# Patient Record
Sex: FEMALE | Race: WHITE | NOT HISPANIC OR LATINO | Employment: FULL TIME | URBAN - METROPOLITAN AREA
[De-identification: names, ages, dates, MRNs, and addresses within clinical notes are randomized per-mention and may not be internally consistent; named-entity substitution may affect disease eponyms.]

---

## 2018-11-19 ENCOUNTER — CONVERSION ENCOUNTER (OUTPATIENT)
Dept: MAMMOGRAPHY | Facility: HOSPITAL | Age: 53
End: 2018-11-19

## 2019-12-03 ENCOUNTER — HOSPITAL ENCOUNTER (OUTPATIENT)
Dept: ULTRASOUND IMAGING | Facility: HOSPITAL | Age: 54
Discharge: HOME | End: 2019-12-03
Attending: NURSE PRACTITIONER

## 2019-12-03 LAB
T4 FREE SERPL-MCNC: 1.2 NG/DL (ref 0.9–1.8)
TSH SERPL-ACNC: 0.32 M[IU]/L (ref 0.27–4.2)

## 2019-12-04 LAB
CONV ANTI MICROSOMAL AB: 36 IU/ML (ref 0–34)
T3FREE SERPL-MCNC: 3.2 PG/ML (ref 2–4.4)

## 2019-12-09 ENCOUNTER — HOSPITAL ENCOUNTER (OUTPATIENT)
Dept: GENERAL RADIOLOGY | Facility: HOSPITAL | Age: 54
Discharge: HOME | End: 2019-12-09
Attending: NURSE PRACTITIONER

## 2019-12-12 ENCOUNTER — HOSPITAL ENCOUNTER (OUTPATIENT)
Dept: ULTRASOUND IMAGING | Facility: HOSPITAL | Age: 54
Discharge: HOME | End: 2019-12-12
Attending: NURSE PRACTITIONER

## 2020-08-26 ENCOUNTER — HOSPITAL ENCOUNTER (OUTPATIENT)
Dept: ORTHOPEDIC SURGERY | Facility: CLINIC | Age: 55
Setting detail: RECURRING SERIES
Discharge: HOME | End: 2020-11-24
Attending: ORTHOPAEDIC SURGERY

## 2020-10-16 ENCOUNTER — HOSPITAL ENCOUNTER (OUTPATIENT)
Dept: LAB | Facility: HOSPITAL | Age: 55
Discharge: HOME | End: 2020-10-16

## 2020-10-16 LAB
ALBUMIN SERPL-MCNC: 4.6 G/DL (ref 3.5–5)
ALBUMIN/GLOB SERPL: 1.6 {RATIO} (ref 1.4–2.6)
ALP SERPL-CCNC: 84 U/L (ref 53–141)
ALT SERPL-CCNC: 19 U/L (ref 10–40)
ANION GAP SERPL CALC-SCNC: 14 MMOL/L (ref 8–19)
AST SERPL-CCNC: 20 U/L (ref 15–50)
BASOPHILS # BLD AUTO: 0.07 10*3/UL (ref 0–0.2)
BASOPHILS NFR BLD AUTO: 1.2 % (ref 0–3)
BILIRUB SERPL-MCNC: 0.31 MG/DL (ref 0.2–1.3)
BUN SERPL-MCNC: 18 MG/DL (ref 5–25)
BUN/CREAT SERPL: 23 {RATIO} (ref 6–20)
CALCIUM SERPL-MCNC: 9.9 MG/DL (ref 8.7–10.4)
CHLORIDE SERPL-SCNC: 104 MMOL/L (ref 99–111)
CHOLEST SERPL-MCNC: 175 MG/DL (ref 107–200)
CHOLEST/HDLC SERPL: 3.1 {RATIO} (ref 3–6)
CONV ABS IMM GRAN: 0.01 10*3/UL (ref 0–0.2)
CONV CO2: 26 MMOL/L (ref 22–32)
CONV IMMATURE GRAN: 0.2 % (ref 0–1.8)
CONV TOTAL PROTEIN: 7.4 G/DL (ref 6.3–8.2)
CREAT UR-MCNC: 0.79 MG/DL (ref 0.5–0.9)
DEPRECATED RDW RBC AUTO: 44.9 FL (ref 36.4–46.3)
EOSINOPHIL # BLD AUTO: 0.17 10*3/UL (ref 0–0.7)
EOSINOPHIL # BLD AUTO: 3 % (ref 0–7)
ERYTHROCYTE [DISTWIDTH] IN BLOOD BY AUTOMATED COUNT: 13.2 % (ref 11.7–14.4)
FSH SERPL-ACNC: 60.2 M[IU]/ML
GFR SERPLBLD BASED ON 1.73 SQ M-ARVRAT: >60 ML/MIN/{1.73_M2}
GLOBULIN UR ELPH-MCNC: 2.8 G/DL (ref 2–3.5)
GLUCOSE SERPL-MCNC: 104 MG/DL (ref 65–99)
HCT VFR BLD AUTO: 45.4 % (ref 37–47)
HDLC SERPL-MCNC: 56 MG/DL (ref 40–60)
HGB BLD-MCNC: 14.7 G/DL (ref 12–16)
LDLC SERPL CALC-MCNC: 103 MG/DL (ref 70–100)
LYMPHOCYTES # BLD AUTO: 1.87 10*3/UL (ref 1–5)
LYMPHOCYTES NFR BLD AUTO: 33.3 % (ref 20–45)
MCH RBC QN AUTO: 29.8 PG (ref 27–31)
MCHC RBC AUTO-ENTMCNC: 32.4 G/DL (ref 33–37)
MCV RBC AUTO: 92.1 FL (ref 81–99)
MONOCYTES # BLD AUTO: 0.47 10*3/UL (ref 0.2–1.2)
MONOCYTES NFR BLD AUTO: 8.4 % (ref 3–10)
NEUTROPHILS # BLD AUTO: 3.02 10*3/UL (ref 2–8)
NEUTROPHILS NFR BLD AUTO: 53.9 % (ref 30–85)
NRBC CBCN: 0 % (ref 0–0.7)
OSMOLALITY SERPL CALC.SUM OF ELEC: 292 MOSM/KG (ref 273–304)
PLATELET # BLD AUTO: 241 10*3/UL (ref 130–400)
PMV BLD AUTO: 10.9 FL (ref 9.4–12.3)
POTASSIUM SERPL-SCNC: 3.9 MMOL/L (ref 3.5–5.3)
RBC # BLD AUTO: 4.93 10*6/UL (ref 4.2–5.4)
SODIUM SERPL-SCNC: 140 MMOL/L (ref 135–147)
TESTOST SERPL-MCNC: 13 NG/DL (ref 3–41)
TRIGL SERPL-MCNC: 79 MG/DL (ref 40–150)
TSH SERPL-ACNC: 1.24 M[IU]/L (ref 0.27–4.2)
VLDLC SERPL-MCNC: 16 MG/DL (ref 5–37)
WBC # BLD AUTO: 5.61 10*3/UL (ref 4.8–10.8)

## 2020-10-19 LAB
ESTRADIOL SERPL HS-MCNC: <5 PG/ML
PROGEST SERPL-MCNC: 0.1 NG/ML

## 2020-12-16 ENCOUNTER — HOSPITAL ENCOUNTER (OUTPATIENT)
Dept: LAB | Facility: HOSPITAL | Age: 55
Discharge: HOME | End: 2020-12-16

## 2020-12-16 LAB — FSH SERPL-ACNC: 25.5 M[IU]/ML

## 2020-12-17 LAB — ESTRADIOL SERPL HS-MCNC: 68.7 PG/ML

## 2020-12-25 LAB
CONV TESTOSTERONE, FREE: 15 PG/ML
TESTOST FREE MFR SERPL: 0.6 %
TESTOST SERPL-MCNC: 246 NG/DL

## 2021-03-12 ENCOUNTER — HOSPITAL ENCOUNTER (OUTPATIENT)
Dept: LAB | Facility: HOSPITAL | Age: 56
Discharge: HOME | End: 2021-03-12

## 2021-03-12 LAB
FSH SERPL-ACNC: 12.9 M[IU]/ML
TESTOST SERPL-MCNC: 296 NG/DL (ref 3–41)

## 2021-03-13 LAB — ESTRADIOL SERPL HS-MCNC: 87.9 PG/ML

## 2021-03-31 ENCOUNTER — HOSPITAL ENCOUNTER (OUTPATIENT)
Dept: MAMMOGRAPHY | Facility: HOSPITAL | Age: 56
Discharge: HOME | End: 2021-03-31
Attending: NURSE PRACTITIONER

## 2021-06-16 ENCOUNTER — TRANSCRIBE ORDERS (OUTPATIENT)
Dept: ADMINISTRATIVE | Facility: HOSPITAL | Age: 56
End: 2021-06-16

## 2021-06-16 ENCOUNTER — LAB (OUTPATIENT)
Dept: LAB | Facility: HOSPITAL | Age: 56
End: 2021-06-16

## 2021-06-16 DIAGNOSIS — E89.40 POSTABLATIVE OVARIAN FAILURE: ICD-10-CM

## 2021-06-16 LAB
ESTRADIOL SERPL HS-MCNC: 181 PG/ML
FSH SERPL-ACNC: 9.37 MIU/ML

## 2021-06-16 PROCEDURE — 84403 ASSAY OF TOTAL TESTOSTERONE: CPT

## 2021-06-16 PROCEDURE — 83001 ASSAY OF GONADOTROPIN (FSH): CPT

## 2021-06-16 PROCEDURE — 82670 ASSAY OF TOTAL ESTRADIOL: CPT

## 2021-06-16 PROCEDURE — 36415 COLL VENOUS BLD VENIPUNCTURE: CPT

## 2021-06-19 LAB — TESTOST SERPL-MCNC: 216.3 NG/DL

## 2021-10-14 ENCOUNTER — HOSPITAL ENCOUNTER (OUTPATIENT)
Dept: GENERAL RADIOLOGY | Facility: HOSPITAL | Age: 56
Discharge: HOME | End: 2021-10-14
Admitting: NURSE PRACTITIONER

## 2021-10-14 ENCOUNTER — TRANSCRIBE ORDERS (OUTPATIENT)
Dept: GENERAL RADIOLOGY | Facility: HOSPITAL | Age: 56
End: 2021-10-14

## 2021-10-14 DIAGNOSIS — M25.559 HIP PAIN: Primary | ICD-10-CM

## 2021-10-14 DIAGNOSIS — M25.559 HIP PAIN: ICD-10-CM

## 2021-10-14 PROCEDURE — 73502 X-RAY EXAM HIP UNI 2-3 VIEWS: CPT

## 2022-02-11 ENCOUNTER — TREATMENT (OUTPATIENT)
Dept: PHYSICAL THERAPY | Facility: CLINIC | Age: 57
End: 2022-02-11

## 2022-02-11 DIAGNOSIS — R29.898 WEAKNESS OF RIGHT UPPER EXTREMITY: ICD-10-CM

## 2022-02-11 DIAGNOSIS — M25.611 SHOULDER STIFFNESS, RIGHT: ICD-10-CM

## 2022-02-11 DIAGNOSIS — Z98.890 S/P RIGHT ROTATOR CUFF REPAIR: Primary | ICD-10-CM

## 2022-02-11 PROCEDURE — 97110 THERAPEUTIC EXERCISES: CPT | Performed by: PHYSICAL THERAPIST

## 2022-02-11 PROCEDURE — 97140 MANUAL THERAPY 1/> REGIONS: CPT | Performed by: PHYSICAL THERAPIST

## 2022-02-11 PROCEDURE — 97161 PT EVAL LOW COMPLEX 20 MIN: CPT | Performed by: PHYSICAL THERAPIST

## 2022-02-11 NOTE — PROGRESS NOTES
Physical Therapy Initial Evaluation and Plan of Care    Patient: Shobha Polo   : 1965  Diagnosis/ICD-10 Code:  S/P right rotator cuff repair [Z98.890]  Referring practitioner: Hernan Jensen MD  Date of Initial Visit: 2022  Today's Date: 2022  Patient seen for 1 sessions           Subjective Questionnaire: QuickDASH: 34 = 40-59%      Subjective Evaluation    History of Present Illness  Mechanism of injury: Pt is s/p Right RTC repair, biceps tenodesis 22, presents without her sling today. She reports having the same surgery on the left previously. She has been doing pendulums and lower level stretching at home.     Pain  Current pain ratin  At best pain ratin  At worst pain ratin  Quality: tight and discomfort  Relieving factors: rest  Aggravating factors: sleeping    Hand dominance: right    Patient Goals  Patient goals for therapy: decreased pain, increased motion, increased strength and independence with ADLs/IADLs             Objective          Observations     Additional Shoulder Observation Details  Pt presents to clinic without a sling, incisions are well healed. Mild scapular winging    Passive Range of Motion     Right Shoulder   Flexion: 110 degrees with pain  Abduction: 105 degrees   External rotation 45°: 40 degrees   Internal rotation 45°: 45 degrees     Strength/Myotome Testing     Additional Strength Details  NT due to surgical precautions      See Exercise, Manual, and Modality Logs for complete treatment.     Assessment & Plan     Assessment  Impairments: abnormal muscle firing, abnormal or restricted ROM, activity intolerance, impaired physical strength and pain with function  Functional Limitations: carrying objects, lifting, sleeping, reaching behind back and reaching overhead  Assessment details: The patient presents to physical therapy with complaints of right shoulder pain s/p Right RTC repair, biceps tenodesis 22. The patient presents  with associated shoulder weakness, stiffness, and functional deficits (QuickDASH). The patient would benefit from skilled PT intervention to address the above mentioned functional limitations.     Prognosis: good    Goals  Plan Goals: SHOULDER  PROBLEMS:     1. The patient has limited ROM of the right shoulder.    LTG 1: 12 weeks:  The patient will demonstrate AROM 160 degrees of shoulder flexion, 150 degrees of shoulder abduction, 80 degrees of shoulder external rotation, and 80 degrees of shoulder internal rotation to allow the patient to reach into upper kitchen cabinets and manipulate clothing behind the back with greater ease.    STATUS:  New   STG 1a: 6 weeks:  The patient will demonstrate PROM 160 degrees of  shoulder flexion, 150 degrees of shoulder abduction, 80 degrees of shoulder external rotation, and 80 degrees of shoulder internal rotation.    STATUS:  New   TREATMENT: Manual therapy, therapeutic exercise, home exercise instruction, and modalities as needed to include: electrical stimulation, ultrasound, moist heat, and ice.    2. The patient has limited strength of the right shoulder.   LTG 2: 12 weeks:  The patient will demonstrate 4+ /5 strength for shoulder flexion, abduction, external rotation, and internal rotation in order to demonstrate improved shoulder stability.    STATUS:  New   STG 2a: 6 weeks:  The patient will demonstrate 4- /5 strength for shoulder flexion, abduction, external rotation, and internal rotation.    STATUS:  New   STG2b:  6 weeks:  The patient will be independent with home exercises.     STATUS:  New   TREATMENT: Manual therapy, therapeutic exercise, home exercise instruction, and modalities as needed to include: electrical stimulation, ultrasound, moist heat, and ice.     3. The patient complains of pain to the right shoulder.   LTG 3: 12 weeks:  The patient will report a pain rating of 3 /10 or better in order to improve sleep quality and tolerance to performance of  activities of daily living.    STATUS:  New   STG 3a: 6 weeks:  The patient will report a pain rating of 5 /10 or better.     STATUS:  New   TREATMENT: Manual therapy, therapeutic exercise, home exercise instruction, and modalities as needed to include: electrical stimulation, ultrasound, moist heat, and ice.    4. Carrying, Moving, and Handling Objects Functional Limitation     LTG 4: 12 weeks:  The patient will demonstrate 1-19% limitation by achieving a score of 12-19 on the QuickDASH.    STATUS:  New   STG 4a: 6 weeks:  The patient will demonstrate 20-39 % limitation by achieving a score of 20-27 on the QuickDASH.      STATUS:  New   TREATMENT:  Manual therapy, therapeutic exercise, home exercise instruction, and modalities as needed to include: moist heat, electrical stimulation, and ultrasound.       Plan  Therapy options: will be seen for skilled therapy services  Planned modality interventions: TENS, cryotherapy, thermotherapy (hydrocollator packs) and dry needling  Planned therapy interventions: functional ROM exercises, home exercise program, joint mobilization, manual therapy, soft tissue mobilization, stretching and strengthening  Frequency: 3x week  Duration in weeks: 12  Treatment plan discussed with: patient        Visit Diagnoses:    ICD-10-CM ICD-9-CM   1. S/P right rotator cuff repair  Z98.890 V45.89   2. Shoulder stiffness, right  M25.611 719.51   3. Weakness of right upper extremity  R29.898 729.89       History # of Personal Factors and/or Comorbidities: LOW (0)  Examination of Body System(s): # of elements: LOW (1-2)  Clinical Presentation: STABLE   Clinical Decision Making: LOW       Timed:         Manual Therapy:    10     mins  04925;     Therapeutic Exercise:    15     mins  49601;     Neuromuscular Michelle:    0    mins  78862;    Therapeutic Activity:     0     mins  96799;     Gait Trainin     mins  83302;     Ultrasound:     0     mins  36877;    Ionto                                0    mins   67832  Self Care                       0     mins   28015  Canalith Repos    0     mins 15481      Un-Timed:  Electrical Stimulation:    0     mins  56895 (MC );  Dry Needling     0     mins self-pay  Traction     0     mins 65404  Low Eval     15     Mins  31471  Mod Eval     0     Mins  97177  High Eval                       0     Mins  68188  Re-Eval                           0    mins  15210    Timed Treatment:   25   mins   Total Treatment:     40   mins    PT SIGNATURE: Saqib Muhammad PT     Electronically signed 2/14/2022    [place] License: PT - [id number]     Initial Certification  Certification Period: 2/14/2022 thru 5/14/2022  I certify that the therapy services are furnished while this patient is under my care.  The services outlined above are required by this patient, and will be reviewed every 90 days.     PHYSICIAN: Hernan Jensen MD   NPI: [id number]                                        DATE:     Please sign and return via fax to [phone]. Thank you, [facility].

## 2022-02-15 ENCOUNTER — TREATMENT (OUTPATIENT)
Dept: PHYSICAL THERAPY | Facility: CLINIC | Age: 57
End: 2022-02-15

## 2022-02-15 DIAGNOSIS — R29.898 WEAKNESS OF RIGHT UPPER EXTREMITY: ICD-10-CM

## 2022-02-15 DIAGNOSIS — M25.611 SHOULDER STIFFNESS, RIGHT: ICD-10-CM

## 2022-02-15 DIAGNOSIS — Z98.890 S/P RIGHT ROTATOR CUFF REPAIR: Primary | ICD-10-CM

## 2022-02-15 PROCEDURE — 97140 MANUAL THERAPY 1/> REGIONS: CPT | Performed by: PHYSICAL THERAPIST

## 2022-02-15 PROCEDURE — 97110 THERAPEUTIC EXERCISES: CPT | Performed by: PHYSICAL THERAPIST

## 2022-02-15 NOTE — PROGRESS NOTES
Physical Therapy Daily Progress Note    Patient: Shobha Polo   : 1965  Diagnosis/ICD-10 Code:  S/P right rotator cuff repair [Z98.890]  Referring practitioner: Hernan Jensen MD  Date of Initial Visit: Type: THERAPY  Noted: 2022  Today's Date: 2/15/2022  Patient seen for 2 sessions           Subjective Evaluation    History of Present Illness    Subjective comment: Pt reporting she is having more soreness and stiffness this morning, she did lift some bags yesterday. Pain  Current pain ratin           Objective   See Exercise, Manual, and Modality Logs for complete treatment.       Assessment & Plan     Assessment    Assessment details: Pt tolerated today's session fair, does present with increased stiffness and pain with flexion AAROM and PROM today, with symptoms of impingement. Progressed AAROM today, discussed pt using ice at home, and discussed reducing lifting activities at this time for healing. Pt would benefit from continued skilled therapy to address deficits. Progress per plan of care.                 Manual Therapy:    15     mins  21257;  Therapeutic Exercise:    12     mins  46075;     Neuromuscular Michelle:    0    mins  98278;    Therapeutic Activity:     0     mins  30453;     Gait Trainin     mins  33194;     Ultrasound:     0     mins  38611;    Electrical Stimulation:    0     mins  15439 ( );  Dry Needling     0     mins self-pay;  Aquatic Therapy    0     mins  25797;  Mechanical Traction    0     mins  46876  Moist Heat     0     mins  No charge    Timed Treatment:   27   mins   Total Treatment:     27   mins    Saqib Muhammad PT  Physical Therapist    Electronically signed 2/15/2022    [place] License: PT - [id number]

## 2022-02-16 ENCOUNTER — TREATMENT (OUTPATIENT)
Dept: PHYSICAL THERAPY | Facility: CLINIC | Age: 57
End: 2022-02-16

## 2022-02-16 DIAGNOSIS — Z98.890 S/P RIGHT ROTATOR CUFF REPAIR: Primary | ICD-10-CM

## 2022-02-16 DIAGNOSIS — R29.898 WEAKNESS OF RIGHT UPPER EXTREMITY: ICD-10-CM

## 2022-02-16 DIAGNOSIS — M25.611 SHOULDER STIFFNESS, RIGHT: ICD-10-CM

## 2022-02-16 PROCEDURE — 97140 MANUAL THERAPY 1/> REGIONS: CPT | Performed by: PHYSICAL THERAPIST

## 2022-02-16 PROCEDURE — 97530 THERAPEUTIC ACTIVITIES: CPT | Performed by: PHYSICAL THERAPIST

## 2022-02-16 PROCEDURE — 97110 THERAPEUTIC EXERCISES: CPT | Performed by: PHYSICAL THERAPIST

## 2022-02-16 NOTE — PROGRESS NOTES
"Physical Therapy Daily Progress Note    Patient: Shobha Polo   : 1965  Diagnosis/ICD-10 Code:  S/P right rotator cuff repair [Z98.890]  Referring practitioner: No ref. provider found  Date of Initial Visit: Type: THERAPY  Noted: 2022  Today's Date: 2022  Patient seen for 3 sessions           Subjective Evaluation    History of Present Illness    Subjective comment: Pt reporting she is feeling less stiff today, but still notices \"catches\" in her shoulder with the AAROM flexion.Pain  Current pain ratin           Objective   See Exercise, Manual, and Modality Logs for complete treatment.       Assessment & Plan     Assessment    Assessment details: ROM much improved today, less pain at end ranges. Likely having impingement at this time with OH activities. Pt tolerated today's session well. Pt would benefit from continued skilled therapy to address deficits. Progress per plan of care.                 Manual Therapy:    10     mins  33351;  Therapeutic Exercise:    10     mins  65857;     Neuromuscular Michelle:    0    mins  46541;    Therapeutic Activity:     8     mins  59464;     Gait Trainin     mins  89093;     Ultrasound:     0     mins  13867;    Electrical Stimulation:    0     mins  81129 ( );  Dry Needling     0     mins self-pay;  Aquatic Therapy    0     mins  60825;  Mechanical Traction    0     mins  63307  Moist Heat     0     mins  No charge    Timed Treatment:   28   mins   Total Treatment:     28   mins    Saqib Muhammad PT  Physical Therapist    Electronically signed 2022    [place] License: PT - [id number]   "

## 2022-02-22 ENCOUNTER — TREATMENT (OUTPATIENT)
Dept: PHYSICAL THERAPY | Facility: CLINIC | Age: 57
End: 2022-02-22

## 2022-02-22 DIAGNOSIS — R29.898 WEAKNESS OF RIGHT UPPER EXTREMITY: ICD-10-CM

## 2022-02-22 DIAGNOSIS — M25.611 SHOULDER STIFFNESS, RIGHT: ICD-10-CM

## 2022-02-22 DIAGNOSIS — Z98.890 S/P RIGHT ROTATOR CUFF REPAIR: Primary | ICD-10-CM

## 2022-02-22 PROCEDURE — 97530 THERAPEUTIC ACTIVITIES: CPT | Performed by: PHYSICAL THERAPIST

## 2022-02-22 PROCEDURE — 97110 THERAPEUTIC EXERCISES: CPT | Performed by: PHYSICAL THERAPIST

## 2022-02-22 PROCEDURE — 97112 NEUROMUSCULAR REEDUCATION: CPT | Performed by: PHYSICAL THERAPIST

## 2022-02-22 NOTE — PROGRESS NOTES
Physical Therapy Daily Progress Note    Patient: Shobha Polo   : 1965  Diagnosis/ICD-10 Code:  S/P right rotator cuff repair [Z98.890]  Referring practitioner: Hernan Jensen MD  Date of Initial Visit: Type: THERAPY  Noted: 2022  Today's Date: 2022  Patient seen for 4 sessions           Subjective Evaluation    History of Present Illness    Subjective comment: Pt reporting she had a good report from the physician, he wrote her another script and wants her to continue with PT.Pain  Current pain rating: 3           Objective   See Exercise, Manual, and Modality Logs for complete treatment.       Assessment & Plan     Assessment    Assessment details: Pt tolerated today's session well, progressed isometrics today with pt having mild increased pain with IR, but this reduced with repetitions. Pt responded well to pulleys as well for improving ROM. Pt would benefit from continued skilled therapy to address deficits. Progress per plan of care.                 Manual Therapy:    0     mins  45601;  Therapeutic Exercise:    10     mins  64173;     Neuromuscular Michelle:    12    mins  26722;    Therapeutic Activity:     8     mins  86047;     Gait Trainin     mins  32795;     Ultrasound:     0     mins  98113;    Electrical Stimulation:    0     mins  58012 ( );  Dry Needling     0     mins self-pay;  Aquatic Therapy    0     mins  90623;  Mechanical Traction    0     mins  19852  Moist Heat     0     mins  No charge    Timed Treatment:   30   mins   Total Treatment:     30   mins    Saqib Muhammad PT  Physical Therapist    Electronically signed 2022    [place] License: PT - [id number]

## 2022-02-24 ENCOUNTER — TREATMENT (OUTPATIENT)
Dept: PHYSICAL THERAPY | Facility: CLINIC | Age: 57
End: 2022-02-24

## 2022-02-24 DIAGNOSIS — Z98.890 S/P RIGHT ROTATOR CUFF REPAIR: Primary | ICD-10-CM

## 2022-02-24 DIAGNOSIS — M25.611 SHOULDER STIFFNESS, RIGHT: ICD-10-CM

## 2022-02-24 DIAGNOSIS — R29.898 WEAKNESS OF RIGHT UPPER EXTREMITY: ICD-10-CM

## 2022-02-24 PROCEDURE — 97140 MANUAL THERAPY 1/> REGIONS: CPT | Performed by: PHYSICAL THERAPIST

## 2022-02-24 PROCEDURE — 97110 THERAPEUTIC EXERCISES: CPT | Performed by: PHYSICAL THERAPIST

## 2022-02-24 PROCEDURE — 97530 THERAPEUTIC ACTIVITIES: CPT | Performed by: PHYSICAL THERAPIST

## 2022-02-24 NOTE — PROGRESS NOTES
Physical Therapy Daily Progress Note    Patient: Shobha Polo   : 1965  Diagnosis/ICD-10 Code:  S/P right rotator cuff repair [Z98.890]  Referring practitioner: Hernan Jensen MD  Date of Initial Visit: Type: THERAPY  Noted: 2022  Today's Date: 2022  Patient seen for 5 sessions           Subjective Evaluation    History of Present Illness    Subjective comment: Pt reporting she was not too sore from the other day, still occasionally has catches in her shoulder. Felt a little sharp pain when reaching for her cell phone in the passenger seat, but feeling better now.Pain  Current pain ratin           Objective   See Exercise, Manual, and Modality Logs for complete treatment.       Assessment & Plan     Assessment    Assessment details: Pt tolerated today's session well with progression of resistance and ROM, minimal pain during session except at end range with PROM. Pt would benefit from continued skilled therapy to address deficits. Progress per plan of care.                 Manual Therapy:    10     mins  25466;  Therapeutic Exercise:    12     mins  83837;     Neuromuscular Michelle:    0    mins  25530;    Therapeutic Activity:     8     mins  59514;     Gait Trainin     mins  05657;     Ultrasound:     0     mins  50952;    Electrical Stimulation:    0     mins  71522 ( );  Dry Needling     0     mins self-pay;  Aquatic Therapy    0     mins  89014;  Mechanical Traction    0     mins  17231  Moist Heat     0     mins  No charge    Timed Treatment:   30   mins   Total Treatment:     30   mins    Saqib Muhammad PT  Physical Therapist    Electronically signed 2022    [place] License: PT - [id number]

## 2022-03-02 ENCOUNTER — TREATMENT (OUTPATIENT)
Dept: PHYSICAL THERAPY | Facility: CLINIC | Age: 57
End: 2022-03-02

## 2022-03-02 DIAGNOSIS — M25.611 SHOULDER STIFFNESS, RIGHT: ICD-10-CM

## 2022-03-02 DIAGNOSIS — R29.898 WEAKNESS OF RIGHT UPPER EXTREMITY: ICD-10-CM

## 2022-03-02 DIAGNOSIS — Z98.890 S/P RIGHT ROTATOR CUFF REPAIR: Primary | ICD-10-CM

## 2022-03-02 PROCEDURE — 97110 THERAPEUTIC EXERCISES: CPT | Performed by: PHYSICAL THERAPIST

## 2022-03-02 PROCEDURE — 97530 THERAPEUTIC ACTIVITIES: CPT | Performed by: PHYSICAL THERAPIST

## 2022-03-02 PROCEDURE — 97140 MANUAL THERAPY 1/> REGIONS: CPT | Performed by: PHYSICAL THERAPIST

## 2022-03-02 NOTE — PROGRESS NOTES
"Physical Therapy Daily Progress Note    Patient: Shobha Polo   : 1965  Diagnosis/ICD-10 Code:  S/P right rotator cuff repair [Z98.890]  Referring practitioner: Hernan Jensen MD  Date of Initial Visit: Type: THERAPY  Noted: 2022  Today's Date: 3/2/2022  Patient seen for 6 sessions           Subjective Evaluation    History of Present Illness    Subjective comment: Pt reporting she reached out to catch a kid at school and felt a pain in her shoulder. She is mostly noticing a catch in her shoulder now.Pain  Current pain ratin           Objective   See Exercise, Manual, and Modality Logs for complete treatment.       Assessment & Plan     Assessment    Assessment details: Pt tolerated today's session fair, she is complaining of a \"catch\" in her shoulder with overhead motion. This did slightly improve with gentle capsule mobilizations, but seems to have pain related to impingement following a strain with the incident she described a couple days ago. Instructed pt to apply ice, she is to wear the sling intermittently tomorrow just to rest for a few minutes, then assess her symptoms. Pt would benefit from continued skilled therapy to address deficits. Progress per plan of care.                 Manual Therapy:    12     mins  60935;  Therapeutic Exercise:    10     mins  65184;     Neuromuscular Michelle:    0    mins  70189;    Therapeutic Activity:     8     mins  94604;     Gait Trainin     mins  94118;     Ultrasound:     0     mins  49198;    Electrical Stimulation:    0     mins  38595 ( );  Dry Needling     0     mins self-pay;  Aquatic Therapy    0     mins  59051;  Mechanical Traction    0     mins  41193  Moist Heat     0     mins  No charge    Timed Treatment:   30   mins   Total Treatment:     30   mins    Saqib Muhammad PT  Physical Therapist    Electronically signed 3/2/2022    [place] License: PT - [id number]   "

## 2022-03-03 ENCOUNTER — TREATMENT (OUTPATIENT)
Dept: PHYSICAL THERAPY | Facility: CLINIC | Age: 57
End: 2022-03-03

## 2022-03-03 DIAGNOSIS — M25.611 SHOULDER STIFFNESS, RIGHT: ICD-10-CM

## 2022-03-03 DIAGNOSIS — R29.898 WEAKNESS OF RIGHT UPPER EXTREMITY: ICD-10-CM

## 2022-03-03 DIAGNOSIS — Z98.890 S/P RIGHT ROTATOR CUFF REPAIR: Primary | ICD-10-CM

## 2022-03-03 PROCEDURE — 97110 THERAPEUTIC EXERCISES: CPT | Performed by: PHYSICAL THERAPIST

## 2022-03-03 NOTE — PROGRESS NOTES
Physical Therapy Daily Progress Note    Patient: Shobha Polo   : 1965  Diagnosis/ICD-10 Code:  S/P right rotator cuff repair [Z98.890]  Referring practitioner: Hrenan Jensen MD  Date of Initial Visit: Type: THERAPY  Noted: 2022  Today's Date: 3/3/2022  Patient seen for 7 sessions           Subjective Evaluation    History of Present Illness    Subjective comment: Pt reporting she is still very sore from reacting and reaching toward a child that was falling at work. She didn't catch the child, but the reaction of reaching is what seems to have bothered her shoulder.Pain  Current pain ratin           Objective   See Exercise, Manual, and Modality Logs for complete treatment.       Assessment & Plan     Assessment    Assessment details: Pt is able to apply pressure with both IR, ER, and elbow flexion indicating all structures are intact. She improves with overhead elevation with a supinated forearm, seeming to be exhibiting symptoms similar to impingement due to the increased inflammation in her shoulder from the incident she had. Today, performed below shoulder level movements, recommended doing isometrics at home, along with passive flexion walk away activity with supinated forearm, then applying ice.                Manual Therapy:    0     mins  31230;  Therapeutic Exercise:    24     mins  69199;     Neuromuscular Michelle:    0    mins  30835;    Therapeutic Activity:     0     mins  61602;     Gait Trainin     mins  71728;     Ultrasound:     0     mins  05333;    Electrical Stimulation:    0     mins  36177 ( );  Dry Needling     0     mins self-pay;  Aquatic Therapy    0     mins  88764;  Mechanical Traction    0     mins  37755  Moist Heat     0     mins  No charge    Timed Treatment:   24   mins   Total Treatment:     24   mins    Saqib Muhammad PT  Physical Therapist    Electronically signed 3/3/2022    [place] License: PT - [id number]

## 2022-03-07 ENCOUNTER — TREATMENT (OUTPATIENT)
Dept: PHYSICAL THERAPY | Facility: CLINIC | Age: 57
End: 2022-03-07

## 2022-03-07 DIAGNOSIS — M25.611 SHOULDER STIFFNESS, RIGHT: ICD-10-CM

## 2022-03-07 DIAGNOSIS — Z98.890 S/P RIGHT ROTATOR CUFF REPAIR: Primary | ICD-10-CM

## 2022-03-07 DIAGNOSIS — R29.898 WEAKNESS OF RIGHT UPPER EXTREMITY: ICD-10-CM

## 2022-03-07 PROCEDURE — 97110 THERAPEUTIC EXERCISES: CPT | Performed by: PHYSICAL THERAPIST

## 2022-03-07 PROCEDURE — 97140 MANUAL THERAPY 1/> REGIONS: CPT | Performed by: PHYSICAL THERAPIST

## 2022-03-07 NOTE — PROGRESS NOTES
Physical Therapy Daily Progress Note    Patient: Shobha Polo   : 1965  Diagnosis/ICD-10 Code:  S/P right rotator cuff repair [Z98.890]  Referring practitioner: Hernan Jensen MD  Date of Initial Visit: Type: THERAPY  Noted: 2022  Today's Date: 3/7/2022  Patient seen for 8 sessions           Subjective Evaluation    History of Present Illness    Subjective comment: Pt reporting her shoulder has been feeling better over the weekend, she still has soreness, but the starp pain is better.Pain  Current pain ratin           Objective   See Exercise, Manual, and Modality Logs for complete treatment.       Assessment & Plan     Assessment    Assessment details: Pt tolerated today's session well, able to tolerate more today, however still having increased pain. Pt would benefit from continued skilled therapy to address deficits. Progress per plan of care.                 Manual Therapy:    12    mins  13176;  Therapeutic Exercise:    12     mins  83820;     Neuromuscular Michelle:    0    mins  33371;    Therapeutic Activity:     0     mins  60420;     Gait Trainin     mins  61055;     Ultrasound:     0     mins  83565;    Electrical Stimulation:    0     mins  01403 ( );  Dry Needling     0     mins self-pay;  Aquatic Therapy    0     mins  69443;  Mechanical Traction    0     mins  28307  Moist Heat     0     mins  No charge    Timed Treatment:   24   mins   Total Treatment:     24   mins    Saqib Muhammad PT  Physical Therapist    Electronically signed 3/7/2022    [place] License: PT - [id number]

## 2022-03-10 ENCOUNTER — TELEPHONE (OUTPATIENT)
Dept: PHYSICAL THERAPY | Facility: CLINIC | Age: 57
End: 2022-03-10

## 2022-03-17 ENCOUNTER — TREATMENT (OUTPATIENT)
Dept: PHYSICAL THERAPY | Facility: CLINIC | Age: 57
End: 2022-03-17

## 2022-03-17 DIAGNOSIS — M25.611 SHOULDER STIFFNESS, RIGHT: ICD-10-CM

## 2022-03-17 DIAGNOSIS — R29.898 WEAKNESS OF RIGHT UPPER EXTREMITY: ICD-10-CM

## 2022-03-17 DIAGNOSIS — Z98.890 S/P RIGHT ROTATOR CUFF REPAIR: Primary | ICD-10-CM

## 2022-03-17 PROCEDURE — 97110 THERAPEUTIC EXERCISES: CPT | Performed by: PHYSICAL THERAPIST

## 2022-03-17 PROCEDURE — 97140 MANUAL THERAPY 1/> REGIONS: CPT | Performed by: PHYSICAL THERAPIST

## 2022-03-17 NOTE — PROGRESS NOTES
Physical Therapy Daily Treatment Note    Patient: Shobha Polo   : 1965  Referring practitioner: Hernan Jensen MD  Date of Initial Visit: Type: THERAPY  Noted: 2022  Today's Date: 3/17/2022  Patient seen for 9 sessions         Subjective   Shobha Polo reports: She had cancellations due to having a stomach virus.    Objective   See Exercise, Manual, and Modality Logs for complete treatment.     Assessment & Plan     Assessment    Assessment details: Pt tolerated session well overall. She had most difficulties and limitations with shoulder abduction (scaption).    Plan  Plan details: continue with POC.        Visit Diagnoses:    ICD-10-CM ICD-9-CM   1. S/P right rotator cuff repair  Z98.890 V45.89   2. Shoulder stiffness, right  M25.611 719.51   3. Weakness of right upper extremity  R29.898 729.89       Progress per Plan of Care         Timed:  Manual Therapy:    12     mins  08728;  Therapeutic Exercise:    12     mins  83240;     Neuromuscular Michelle:    0    mins  57663;    Therapeutic Activity:     0     mins  69500;     Gait Trainin     mins  32910;     Aquatic Therapy     0     mins  41239;     Ultrasound:     0     mins  70867;    Electrical Stimulation:    0     mins  58668 ( );    Untimed:  Electrical Stimulation:    0     mins  33039 ( );  Mechanical Traction:    0     mins  04931;     Timed Treatment:   24   mins   Total Treatment:     24   mins  VERN Pearson License: [id number]

## 2022-03-21 ENCOUNTER — TREATMENT (OUTPATIENT)
Dept: PHYSICAL THERAPY | Facility: CLINIC | Age: 57
End: 2022-03-21

## 2022-03-21 DIAGNOSIS — Z98.890 S/P RIGHT ROTATOR CUFF REPAIR: Primary | ICD-10-CM

## 2022-03-21 DIAGNOSIS — R29.898 WEAKNESS OF RIGHT UPPER EXTREMITY: ICD-10-CM

## 2022-03-21 DIAGNOSIS — M25.611 SHOULDER STIFFNESS, RIGHT: ICD-10-CM

## 2022-03-21 PROCEDURE — 97140 MANUAL THERAPY 1/> REGIONS: CPT | Performed by: PHYSICAL THERAPIST

## 2022-03-21 PROCEDURE — 97110 THERAPEUTIC EXERCISES: CPT | Performed by: PHYSICAL THERAPIST

## 2022-03-21 PROCEDURE — 97530 THERAPEUTIC ACTIVITIES: CPT | Performed by: PHYSICAL THERAPIST

## 2022-03-21 NOTE — PROGRESS NOTES
Progress Assessment        Patient: Shobha Polo   : 1965  Diagnosis/ICD-10 Code:  S/P right rotator cuff repair [Z98.890]  Referring practitioner: Hernan Jensen MD  Date of Initial Visit: Type: THERAPY  Noted: 2022  Today's Date: 3/21/2022  Patient seen for 10 sessions      Subjective:     Subjective Questionnaire: QuickDASH: 26  Clinical Progress: improved  Home Program Compliance: Yes  Treatment has included: therapeutic exercise, manual therapy and therapeutic activity    Subjective Evaluation    History of Present Illness  Mechanism of injury: Pt reporting she is still having pain with any type of lifting out to the side, but feels she is doing better compared to a few weeks ago when she injured it reaching for a child falling at work (again she didn't actually grab the child). She was unable to come to therapy for a week and a half due to being sick and her granddaughter being sick.    Pain  Current pain ratin         Objective          Active Range of Motion     Right Shoulder   Flexion: 75 degrees   Abduction: 45 degrees   External rotation 0°: 30 degrees   Internal rotation 0°: 60 degrees     Passive Range of Motion     Right Shoulder   Flexion: 165 degrees   Abduction: 165 degrees   External rotation 45°: 60 degrees   Internal rotation 45°: 70 degrees     Strength/Myotome Testing     Right Shoulder     Planes of Motion   Flexion: 3+   Extension: 4-   Abduction: 3+   External rotation at 0°: 3+   Internal rotation at 0°: 4-       Assessment & Plan     Assessment  Impairments: abnormal muscle firing, abnormal or restricted ROM, activity intolerance, impaired physical strength and pain with function  Functional Limitations: carrying objects, lifting, sleeping, reaching behind back and reaching overhead  Assessment details: The patient presents to physical therapy with complaints of right shoulder pain s/p Right RTC repair, biceps tenodesis 22. Pt is currently most  limited with abduction and ER due to impingement type symptoms, she is able to achieve improved ROM in these directions with manual therapy. She is still limited with strength and AROM at this time, but expected to continue to progress with ongoing therapy as she has met two of her goals set at the evaluation. The patient presents with associated shoulder weakness, stiffness, and functional deficits (QuickDASH). The patient would benefit from skilled PT intervention to address the above mentioned functional limitations.     Prognosis: good    Goals  Plan Goals: SHOULDER  PROBLEMS:     1. The patient has limited ROM of the right shoulder.    LTG 1: 12 weeks:  The patient will demonstrate AROM 160 degrees of shoulder flexion, 150 degrees of shoulder abduction, 80 degrees of shoulder external rotation, and 80 degrees of shoulder internal rotation to allow the patient to reach into upper kitchen cabinets and manipulate clothing behind the back with greater ease.    STATUS:  Not met, progressing   STG 1a: 6 weeks:  The patient will demonstrate PROM 160 degrees of  shoulder flexion, 150 degrees of shoulder abduction, 80 degrees of shoulder external rotation, and 80 degrees of shoulder internal rotation.    STATUS:  Not met, progressing   TREATMENT: Manual therapy, therapeutic exercise, home exercise instruction, and modalities as needed to include: electrical stimulation, ultrasound, moist heat, and ice.    2. The patient has limited strength of the right shoulder.   LTG 2: 12 weeks:  The patient will demonstrate 4+ /5 strength for shoulder flexion, abduction, external rotation, and internal rotation in order to demonstrate improved shoulder stability.    STATUS:  Not met, progressing   STG 2a: 6 weeks:  The patient will demonstrate 4- /5 strength for shoulder flexion, abduction, external rotation, and internal rotation.    STATUS:  Not met, progressing   STG2b:  6 weeks:  The patient will be independent with home  exercises.     STATUS:  MET   TREATMENT: Manual therapy, therapeutic exercise, home exercise instruction, and modalities as needed to include: electrical stimulation, ultrasound, moist heat, and ice.     3. The patient complains of pain to the right shoulder.   LTG 3: 12 weeks:  The patient will report a pain rating of 3 /10 or better in order to improve sleep quality and tolerance to performance of activities of daily living.    STATUS: Not met, progressing   STG 3a: 6 weeks:  The patient will report a pain rating of 5 /10 or better.     STATUS:  Not met, progressing   TREATMENT: Manual therapy, therapeutic exercise, home exercise instruction, and modalities as needed to include: electrical stimulation, ultrasound, moist heat, and ice.    4. Carrying, Moving, and Handling Objects Functional Limitation     LTG 4: 12 weeks:  The patient will demonstrate 1-19% limitation by achieving a score of 12-19 on the QuickDASH.    STATUS:  Not met, progressing   STG 4a: 6 weeks:  The patient will demonstrate 20-39 % limitation by achieving a score of 20-27 on the QuickDASH.      STATUS: MET   TREATMENT:  Manual therapy, therapeutic exercise, home exercise instruction, and modalities as needed to include: moist heat, electrical stimulation, and ultrasound.       Plan  Therapy options: will be seen for skilled therapy services  Planned modality interventions: TENS, cryotherapy, thermotherapy (hydrocollator packs) and dry needling  Planned therapy interventions: functional ROM exercises, home exercise program, joint mobilization, manual therapy, soft tissue mobilization, stretching and strengthening  Frequency: 3x week  Duration in weeks: 12  Treatment plan discussed with: patient      Progress toward previous goals:Partially met    See Exercise, Manual, and Modality Logs for complete treatment.         Recommendations: Continue as planned  Timeframe: 2 months  Prognosis to achieve goals: good    PT Signature: Saqib Muhammad,  PT    Electronically signed 3/21/2022    [place] License: PT - [id number]     Based upon review of the patient's progress and continued therapy plan, it is my medical opinion that Shobha Polo should continue physical therapy treatment at [facility]  [address]  [place] [address] [phone].      Timed:         Manual Therapy:    15     mins  75839;     Therapeutic Exercise:    10     mins  36063;     Neuromuscular Michelle:    0    mins  40210;    Therapeutic Activity:     8     mins  22274;     Gait Trainin     mins  13703;     Ultrasound:     0     mins  66294;    Ionto                               0    mins   54609  Self Care                       0     mins   13424  Aquatic                          0     mins 23507          Timed Treatment:   33   mins   Total Treatment:     33   mins      I certify that the therapy services are furnished while this patient is under my care.  The services outlined above are required by this patient, and will be reviewed every 90 days.

## 2022-03-23 ENCOUNTER — TRANSCRIBE ORDERS (OUTPATIENT)
Dept: LAB | Facility: HOSPITAL | Age: 57
End: 2022-03-23

## 2022-03-23 ENCOUNTER — TREATMENT (OUTPATIENT)
Dept: PHYSICAL THERAPY | Facility: CLINIC | Age: 57
End: 2022-03-23

## 2022-03-23 ENCOUNTER — LAB (OUTPATIENT)
Dept: LAB | Facility: HOSPITAL | Age: 57
End: 2022-03-23

## 2022-03-23 DIAGNOSIS — Z98.890 S/P RIGHT ROTATOR CUFF REPAIR: Primary | ICD-10-CM

## 2022-03-23 DIAGNOSIS — M25.611 SHOULDER STIFFNESS, RIGHT: ICD-10-CM

## 2022-03-23 DIAGNOSIS — R29.898 WEAKNESS OF RIGHT UPPER EXTREMITY: ICD-10-CM

## 2022-03-23 DIAGNOSIS — E89.40 SURGICAL MENOPAUSE: Primary | ICD-10-CM

## 2022-03-23 DIAGNOSIS — E89.40 SURGICAL MENOPAUSE: ICD-10-CM

## 2022-03-23 LAB
ESTRADIOL SERPL HS-MCNC: 298 PG/ML
FSH SERPL-ACNC: 1.75 MIU/ML

## 2022-03-23 PROCEDURE — 36415 COLL VENOUS BLD VENIPUNCTURE: CPT

## 2022-03-23 PROCEDURE — 83001 ASSAY OF GONADOTROPIN (FSH): CPT

## 2022-03-23 PROCEDURE — 84403 ASSAY OF TOTAL TESTOSTERONE: CPT

## 2022-03-23 PROCEDURE — 97110 THERAPEUTIC EXERCISES: CPT | Performed by: PHYSICAL THERAPIST

## 2022-03-23 PROCEDURE — 97530 THERAPEUTIC ACTIVITIES: CPT | Performed by: PHYSICAL THERAPIST

## 2022-03-23 PROCEDURE — 82670 ASSAY OF TOTAL ESTRADIOL: CPT

## 2022-03-23 NOTE — PROGRESS NOTES
Physical Therapy Daily Progress Note    Patient: Shobha Polo   : 1965  Diagnosis/ICD-10 Code:  S/P right rotator cuff repair [Z98.890]  Referring practitioner: Hernan Jensen MD  Date of Initial Visit: Type: THERAPY  Noted: 2022  Today's Date: 3/23/2022  Patient seen for 11 sessions           Subjective Evaluation    History of Present Illness    Subjective comment: Pt reporting she is sore, but can tell she is better able to move her shoulder following the last session.       Objective   See Exercise, Manual, and Modality Logs for complete treatment.       Assessment & Plan     Assessment    Assessment details: Pt tolerated today's session well, continues to demonstrate impingement with overhead activities, this reduces with cueing for scapular control, worsens with fatigue as expected. Pt would benefit from continued skilled therapy to address deficits. Progress per plan of care.                 Manual Therapy:    0     mins  12646;  Therapeutic Exercise:    24     mins  52097;     Neuromuscular Michelle:    0    mins  69084;    Therapeutic Activity:     12     mins  24568;     Gait Trainin     mins  38534;     Ultrasound:     0     mins  83837;    Electrical Stimulation:    0     mins  77568 ( );  Dry Needling     0     mins self-pay;  Aquatic Therapy    0     mins  14823;  Mechanical Traction    0     mins  96647  Moist Heat     0     mins  No charge    Timed Treatment:   36   mins   Total Treatment:     36   mins    Saqib Muhammad PT  Physical Therapist    Electronically signed 3/23/2022    [place] License: PT - [id number]

## 2022-03-29 ENCOUNTER — TREATMENT (OUTPATIENT)
Dept: PHYSICAL THERAPY | Facility: CLINIC | Age: 57
End: 2022-03-29

## 2022-03-29 DIAGNOSIS — M25.611 SHOULDER STIFFNESS, RIGHT: ICD-10-CM

## 2022-03-29 DIAGNOSIS — R29.898 WEAKNESS OF RIGHT UPPER EXTREMITY: ICD-10-CM

## 2022-03-29 DIAGNOSIS — Z98.890 S/P RIGHT ROTATOR CUFF REPAIR: Primary | ICD-10-CM

## 2022-03-29 PROCEDURE — 97140 MANUAL THERAPY 1/> REGIONS: CPT | Performed by: PHYSICAL THERAPIST

## 2022-03-29 PROCEDURE — 97530 THERAPEUTIC ACTIVITIES: CPT | Performed by: PHYSICAL THERAPIST

## 2022-03-29 PROCEDURE — 97110 THERAPEUTIC EXERCISES: CPT | Performed by: PHYSICAL THERAPIST

## 2022-04-05 LAB — TESTOST SERPL-MCNC: 326.6 NG/DL

## 2022-04-11 ENCOUNTER — TREATMENT (OUTPATIENT)
Dept: PHYSICAL THERAPY | Facility: CLINIC | Age: 57
End: 2022-04-11

## 2022-04-11 DIAGNOSIS — M25.611 SHOULDER STIFFNESS, RIGHT: ICD-10-CM

## 2022-04-11 DIAGNOSIS — R29.898 WEAKNESS OF RIGHT UPPER EXTREMITY: ICD-10-CM

## 2022-04-11 DIAGNOSIS — Z98.890 S/P RIGHT ROTATOR CUFF REPAIR: Primary | ICD-10-CM

## 2022-04-11 PROCEDURE — 97110 THERAPEUTIC EXERCISES: CPT | Performed by: PHYSICAL THERAPIST

## 2022-04-11 PROCEDURE — 97140 MANUAL THERAPY 1/> REGIONS: CPT | Performed by: PHYSICAL THERAPIST

## 2022-04-11 PROCEDURE — 97530 THERAPEUTIC ACTIVITIES: CPT | Performed by: PHYSICAL THERAPIST

## 2022-04-11 NOTE — PROGRESS NOTES
Physical Therapy Daily Treatment Note      Patient: Shobha Polo   : 1965  Referring practitioner: Hernan Jensen MD  Date of Initial Visit: Type: THERAPY  Noted: 2022  Today's Date: 2022  Patient seen for 13 sessions           Subjective  Shobha Polo reports: her doctor wanted to do a total shoulder at first. Shobha reported she is still very limited in her R shoulder movement. During manual work, Shobha and PTA felt and heard mobilizations at R shoulder. PTA explained to pt that these are scar tissue adhesions. Shobha able to appreciate the improved shoulder/GH movement during and after session.       Objective   See Exercise, Manual, and Modality Logs for complete treatment.       Assessment/Plan  Shobha tolerated stretching/ROM and exercises in L side lying, supine and seated.  Skilled care required to fully attend to deficits in AROM/PROM, strength and gross functional ability to return to prior level.    Visit Diagnoses:    ICD-10-CM ICD-9-CM   1. S/P right rotator cuff repair  Z98.890 V45.89   2. Weakness of right upper extremity  R29.898 729.89   3. Shoulder stiffness, right  M25.611 719.51       Progress per Plan of Care and Progress strengthening /stabilization /functional activity           Timed:  Manual Therapy:    23     mins  74640;  Therapeutic Exercise:    10     mins  78504;     Neuromuscular Michelle:        mins  69288;    Therapeutic Activity:     9     mins  48101;     Gait Training:           mins  73575;     Ultrasound:          mins  48974;    Electrical Stimulation:         mins  21707 ( );  Aquatics  __   mins   95192    Untimed:  Electrical Stimulation:         mins  90515 ( );  Mechanical Traction:         mins  28277;     Timed Treatment:   42   mins   Total Treatment:     42   mins    Electronically Signed:  Pippa Noriega PTA  Physical Therapist Assistant    [place] PTA license [id number]

## 2022-04-13 ENCOUNTER — TREATMENT (OUTPATIENT)
Dept: PHYSICAL THERAPY | Facility: CLINIC | Age: 57
End: 2022-04-13

## 2022-04-13 DIAGNOSIS — Z98.890 S/P RIGHT ROTATOR CUFF REPAIR: Primary | ICD-10-CM

## 2022-04-13 DIAGNOSIS — R29.898 WEAKNESS OF RIGHT UPPER EXTREMITY: ICD-10-CM

## 2022-04-13 DIAGNOSIS — M25.611 SHOULDER STIFFNESS, RIGHT: ICD-10-CM

## 2022-04-13 PROCEDURE — 97110 THERAPEUTIC EXERCISES: CPT | Performed by: PHYSICAL THERAPIST

## 2022-04-13 PROCEDURE — 97140 MANUAL THERAPY 1/> REGIONS: CPT | Performed by: PHYSICAL THERAPIST

## 2022-04-13 NOTE — PROGRESS NOTES
"Physical Therapy Daily Treatment Note      Patient: Shobha Polo   : 1965  Referring practitioner: Hernan Jensen MD  Date of Initial Visit: Type: THERAPY  Noted: 2022  Today's Date: 2022  Patient seen for 14 sessions           Subjective  Shobha Polo reports: she had a couple of hours after last session of improved AROM but \"I could tell it was swollen, and it started hurting. Tuesday the pain was so I could hardly move it to the side. 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 Audible/palpable pops again noted.       Objective   See Exercise, Manual, and Modality Logs for complete treatment.       Assessment/Plan  Pain relief and improved P/AROM noted during and after session. Shobha is to use ice at home if pain returns. Skilled care remains necessary to attend to deficits in ROM, strength and functional ability.    Visit Diagnoses:    ICD-10-CM ICD-9-CM   1. S/P right rotator cuff repair  Z98.890 V45.89   2. Weakness of right upper extremity  R29.898 729.89   3. Shoulder stiffness, right  M25.611 719.51       Progress per Plan of Care and Progress strengthening /stabilization /functional activity           Timed:  Manual Therapy:    24     mins  24400;  Therapeutic Exercise:     9   mins  13084;     Neuromuscular Michelle:        mins  89964;    Therapeutic Activity:          mins  76336;     Gait Training:           mins  20830;     Ultrasound:          mins  84701;    Electrical Stimulation:         mins  22154 ( );  Aquatics  __   mins   34946    Untimed:  Electrical Stimulation:         mins  05243 ( );  Mechanical Traction:         mins  33481;     Timed Treatment:  33   mins   Total Treatment:     33  mins    Electronically Signed:  Pippa Noriega PTA  Physical Therapist Assistant    [place] PTA license [id number]            "

## 2022-04-20 ENCOUNTER — TREATMENT (OUTPATIENT)
Dept: PHYSICAL THERAPY | Facility: CLINIC | Age: 57
End: 2022-04-20

## 2022-04-20 DIAGNOSIS — Z98.890 S/P RIGHT ROTATOR CUFF REPAIR: Primary | ICD-10-CM

## 2022-04-20 DIAGNOSIS — M25.611 SHOULDER STIFFNESS, RIGHT: ICD-10-CM

## 2022-04-20 DIAGNOSIS — R29.898 WEAKNESS OF RIGHT UPPER EXTREMITY: ICD-10-CM

## 2022-04-20 PROCEDURE — 97140 MANUAL THERAPY 1/> REGIONS: CPT | Performed by: PHYSICAL THERAPIST

## 2022-04-20 PROCEDURE — 97530 THERAPEUTIC ACTIVITIES: CPT | Performed by: PHYSICAL THERAPIST

## 2022-04-20 PROCEDURE — 97110 THERAPEUTIC EXERCISES: CPT | Performed by: PHYSICAL THERAPIST

## 2022-04-20 NOTE — PROGRESS NOTES
Physical Therapy Daily Treatment Note      Patient: Shobha Polo   : 1965  Referring practitioner: Hernan Jensen MD  Date of Initial Visit: Type: THERAPY  Noted: 2022  Today's Date: 2022  Patient seen for 15 sessions           Subjective Questionnaire:       Subjective Evaluation    History of Present Illness    Subjective comment: Pt reported not having any pain today explaining that she only has pain with reaching up.       Objective   See Exercise, Manual, and Modality Logs for complete treatment.       Assessment & Plan     Assessment    Assessment details: Pt reported she is using 5lb wt for bent over rows and extension.  Pt tolerated tx well.        Visit Diagnoses:    ICD-10-CM ICD-9-CM   1. S/P right rotator cuff repair  Z98.890 V45.89   2. Weakness of right upper extremity  R29.898 729.89   3. Shoulder stiffness, right  M25.611 719.51       Progress per Plan of Care and Progress strengthening /stabilization /functional activity           Timed:  Manual Therapy:    8     mins  06756;  Therapeutic Exercise:    12     mins  67986;     Neuromuscular Michelle:        mins  11851;    Therapeutic Activity:     8     mins  01054;     Gait Training:           mins  79543;     Ultrasound:          mins  92171;    Electrical Stimulation:         mins  40568 ( );  Aquatic Therapy          mins  62490    Untimed:  Electrical Stimulation:         mins  38365 ( );  Mechanical Traction:         mins  33931;     Timed Treatment:   30   mins   Total Treatment:     30   mins    Electronically signed    Maria Luisa Gamez PTA  Physical Therapist Assistant    [employer] license: [id number]

## 2022-04-22 ENCOUNTER — TREATMENT (OUTPATIENT)
Dept: PHYSICAL THERAPY | Facility: CLINIC | Age: 57
End: 2022-04-22

## 2022-04-22 DIAGNOSIS — R29.898 WEAKNESS OF RIGHT UPPER EXTREMITY: ICD-10-CM

## 2022-04-22 DIAGNOSIS — M25.611 SHOULDER STIFFNESS, RIGHT: ICD-10-CM

## 2022-04-22 DIAGNOSIS — Z98.890 S/P RIGHT ROTATOR CUFF REPAIR: Primary | ICD-10-CM

## 2022-04-22 PROCEDURE — 97110 THERAPEUTIC EXERCISES: CPT | Performed by: PHYSICAL THERAPIST

## 2022-04-22 PROCEDURE — 97140 MANUAL THERAPY 1/> REGIONS: CPT | Performed by: PHYSICAL THERAPIST

## 2022-04-22 PROCEDURE — 97530 THERAPEUTIC ACTIVITIES: CPT | Performed by: PHYSICAL THERAPIST

## 2022-04-22 NOTE — PROGRESS NOTES
Progress Assessment        Patient: Shobha Polo   : 1965  Diagnosis/ICD-10 Code:  S/P right rotator cuff repair [Z98.890]  Referring practitioner: Hernan Jensen MD  Date of Initial Visit: Type: THERAPY  Noted: 2022  Today's Date: 2022  Patient seen for 16 sessions      Subjective:     Subjective Questionnaire: QuickDASH: 26  Clinical Progress: improved  Home Program Compliance: Yes  Treatment has included: therapeutic exercise, manual therapy and therapeutic activity    Subjective Evaluation    History of Present Illness  Mechanism of injury: Pt reporting she is still struggling with her shoulder pain and difficulty lifting. She did go back to the physician recently and they are pleased with her progress due to the extensive nature of her surgery. She just feels frustrated with her seeming lack of progress thus far.     Pain  Current pain ratin         Objective   Active Range of Motion     Right Shoulder   Flexion: 100 degrees   Abduction: 60 degrees   External rotation 0°: 50 degrees   Internal rotation 0°: 60 degrees      Passive Range of Motion      Right Shoulder   Flexion: 170 degrees   Abduction: 165 degrees   External rotation 45°: 60 degrees   Internal rotation 45°: 70 degrees      Strength/Myotome Testing     Right Shoulder      Planes of Motion   Flexion: 3+   Extension: 4-   Abduction: 3+   External rotation at 0°: 3+   Internal rotation at 0°: 4-        Assessment & Plan     Assessment  Impairments: abnormal muscle firing, abnormal or restricted ROM, activity intolerance, impaired physical strength and pain with function  Functional Limitations: carrying objects, lifting, sleeping, reaching behind back and reaching overhead  Assessment details: The patient presents to physical therapy with complaints of right shoulder pain s/p Right RTC repair, biceps tenodesis 22. Pt is currently most limited with abduction and ER due to impingement type symptoms, she did  get improved ROM with extensive soft tissue and capsule mobilization. She is exhibiting nearly full PROM in most planes, she is just still very weak. But she is able to perform AROM in gravity assisted and manual assisted positions. She is expected to continue to progress with ongoing therapy. The patient presents with associated shoulder weakness, stiffness, and functional deficits (QuickDASH). The patient would benefit from skilled PT intervention to address the above mentioned functional limitations.     Prognosis: good    Goals  Plan Goals: SHOULDER  PROBLEMS:     1. The patient has limited ROM of the right shoulder.    LTG 1: 12 weeks:  The patient will demonstrate AROM 160 degrees of shoulder flexion, 150 degrees of shoulder abduction, 80 degrees of shoulder external rotation, and 80 degrees of shoulder internal rotation to allow the patient to reach into upper kitchen cabinets and manipulate clothing behind the back with greater ease.    STATUS:  Not met, progressing   STG 1a: 6 weeks:  The patient will demonstrate PROM 160 degrees of  shoulder flexion, 150 degrees of shoulder abduction, 80 degrees of shoulder external rotation, and 80 degrees of shoulder internal rotation.    STATUS:  Not met, progressing   TREATMENT: Manual therapy, therapeutic exercise, home exercise instruction, and modalities as needed to include: electrical stimulation, ultrasound, moist heat, and ice.    2. The patient has limited strength of the right shoulder.   LTG 2: 12 weeks:  The patient will demonstrate 4+ /5 strength for shoulder flexion, abduction, external rotation, and internal rotation in order to demonstrate improved shoulder stability.    STATUS:  Not met, progressing   STG 2a: 6 weeks:  The patient will demonstrate 4- /5 strength for shoulder flexion, abduction, external rotation, and internal rotation.    STATUS:  Not met, progressing   STG2b:  6 weeks:  The patient will be independent with home exercises.     STATUS:   MET   TREATMENT: Manual therapy, therapeutic exercise, home exercise instruction, and modalities as needed to include: electrical stimulation, ultrasound, moist heat, and ice.     3. The patient complains of pain to the right shoulder.   LTG 3: 12 weeks:  The patient will report a pain rating of 3 /10 or better in order to improve sleep quality and tolerance to performance of activities of daily living.    STATUS: Not met, progressing   STG 3a: 6 weeks:  The patient will report a pain rating of 5 /10 or better.     STATUS:  Not met, progressing   TREATMENT: Manual therapy, therapeutic exercise, home exercise instruction, and modalities as needed to include: electrical stimulation, ultrasound, moist heat, and ice.    4. Carrying, Moving, and Handling Objects Functional Limitation     LTG 4: 12 weeks:  The patient will demonstrate 1-19% limitation by achieving a score of 12-19 on the QuickDASH.    STATUS:  Not met, progressing   STG 4a: 6 weeks:  The patient will demonstrate 20-39 % limitation by achieving a score of 20-27 on the QuickDASH.      STATUS: MET   TREATMENT:  Manual therapy, therapeutic exercise, home exercise instruction, and modalities as needed to include: moist heat, electrical stimulation, and ultrasound.       Plan  Therapy options: will be seen for skilled therapy services  Planned modality interventions: TENS, cryotherapy, thermotherapy (hydrocollator packs) and dry needling  Planned therapy interventions: functional ROM exercises, home exercise program, joint mobilization, manual therapy, soft tissue mobilization, stretching and strengthening  Frequency: 3x week  Duration in weeks: 12  Treatment plan discussed with: patient      Progress toward previous goals: Partially Met    See Exercise, Manual, and Modality Logs for complete treatment.         Recommendations: Continue as planned  Timeframe: 1 month  Prognosis to achieve goals: good    PT Signature: Saqib Muhammad, PT    Electronically signed  2022    [place] License: PT - [id number]     Based upon review of the patient's progress and continued therapy plan, it is my medical opinion that Shobha Polo should continue physical therapy treatment at [facility]  [address] [place] [address] [phone].      Timed:         Manual Therapy:    18     mins  91471;     Therapeutic Exercise:    10     mins  25912;     Neuromuscular Michelle:    0    mins  13628;    Therapeutic Activity:     8     mins  24105;     Gait Trainin     mins  74364;     Ultrasound:     0     mins  32983;    Ionto                               0    mins   05884  Self Care                       0     mins   56265  Aquatic                          0     mins 94312      Un-Timed:  Electrical Stimulation:    0     mins  24399 ( );  Dry Needling     0     mins self-pay  Traction     0     mins 58568  Low Eval     0     Mins  65339  Mod Eval     0     Mins  79236  High Eval                       0     Mins  09456  Re-Eval                           0    mins  49497      Timed Treatment:   36   mins   Total Treatment:     36   mins      I certify that the therapy services are furnished while this patient is under my care.  The services outlined above are required by this patient, and will be reviewed every 90 days.

## 2022-04-26 ENCOUNTER — TREATMENT (OUTPATIENT)
Dept: PHYSICAL THERAPY | Facility: CLINIC | Age: 57
End: 2022-04-26

## 2022-04-26 DIAGNOSIS — Z98.890 S/P RIGHT ROTATOR CUFF REPAIR: Primary | ICD-10-CM

## 2022-04-26 DIAGNOSIS — R29.898 WEAKNESS OF RIGHT UPPER EXTREMITY: ICD-10-CM

## 2022-04-26 DIAGNOSIS — M25.611 SHOULDER STIFFNESS, RIGHT: ICD-10-CM

## 2022-04-26 PROCEDURE — 97530 THERAPEUTIC ACTIVITIES: CPT | Performed by: PHYSICAL THERAPIST

## 2022-04-26 PROCEDURE — 97110 THERAPEUTIC EXERCISES: CPT | Performed by: PHYSICAL THERAPIST

## 2022-04-26 PROCEDURE — 97140 MANUAL THERAPY 1/> REGIONS: CPT | Performed by: PHYSICAL THERAPIST

## 2022-04-26 NOTE — PROGRESS NOTES
Physical Therapy Daily Progress Note    Patient: Shobha Polo   : 1965  Diagnosis/ICD-10 Code:  S/P right rotator cuff repair [Z98.890]  Referring practitioner: Hernan Jensen MD  Date of Initial Visit: Type: THERAPY  Noted: 2022  Today's Date: 2022  Patient seen for 17 sessions           Subjective Evaluation    History of Present Illness  Mechanism of injury: Pt reporting she is doing well today, she was not too sore following the last visit.     Pain  Current pain ratin           Objective   See Exercise, Manual, and Modality Logs for complete treatment.       Assessment & Plan     Assessment    Assessment details: Pt tolerated today's session well, emphasis on strengthening today with noted fatigued during exercises. Pt would benefit from continued skilled therapy to address deficits. Progress per plan of care.                 Manual Therapy:    8     mins  22766;  Therapeutic Exercise:    10     mins  93286;     Neuromuscular Michelle:    0    mins  35538;    Therapeutic Activity:     12     mins  75352;     Gait Trainin     mins  38983;     Ultrasound:     0     mins  36930;    Electrical Stimulation:    0     mins  80942 ( );  Dry Needling     0     mins self-pay;  Aquatic Therapy    0     mins  43614;  Mechanical Traction    0     mins  84508  Moist Heat     0     mins  No charge    Timed Treatment:   30   mins   Total Treatment:     30   mins    Saqib Muhammad PT  Physical Therapist    Electronically signed 2022    [place] License: PT - [id number]

## 2022-04-28 ENCOUNTER — TREATMENT (OUTPATIENT)
Dept: PHYSICAL THERAPY | Facility: CLINIC | Age: 57
End: 2022-04-28

## 2022-04-28 DIAGNOSIS — R29.898 WEAKNESS OF RIGHT UPPER EXTREMITY: ICD-10-CM

## 2022-04-28 DIAGNOSIS — M25.611 SHOULDER STIFFNESS, RIGHT: ICD-10-CM

## 2022-04-28 DIAGNOSIS — Z98.890 S/P RIGHT ROTATOR CUFF REPAIR: Primary | ICD-10-CM

## 2022-04-28 PROCEDURE — 97110 THERAPEUTIC EXERCISES: CPT | Performed by: PHYSICAL THERAPIST

## 2022-04-28 PROCEDURE — 97530 THERAPEUTIC ACTIVITIES: CPT | Performed by: PHYSICAL THERAPIST

## 2022-05-03 ENCOUNTER — TREATMENT (OUTPATIENT)
Dept: PHYSICAL THERAPY | Facility: CLINIC | Age: 57
End: 2022-05-03

## 2022-05-03 DIAGNOSIS — R29.898 WEAKNESS OF RIGHT UPPER EXTREMITY: ICD-10-CM

## 2022-05-03 DIAGNOSIS — M25.611 SHOULDER STIFFNESS, RIGHT: ICD-10-CM

## 2022-05-03 DIAGNOSIS — Z98.890 S/P RIGHT ROTATOR CUFF REPAIR: Primary | ICD-10-CM

## 2022-05-03 PROCEDURE — 97110 THERAPEUTIC EXERCISES: CPT | Performed by: PHYSICAL THERAPIST

## 2022-05-03 PROCEDURE — 97140 MANUAL THERAPY 1/> REGIONS: CPT | Performed by: PHYSICAL THERAPIST

## 2022-05-03 NOTE — PROGRESS NOTES
Physical Therapy Daily Treatment Note      Patient: Shobha Polo   : 1965  Referring practitioner: Hernan Jensen MD  Date of Initial Visit: Type: THERAPY  Noted: 2022  Today's Date: 5/3/2022  Patient seen for 19 sessions           Subjective  Shobha Polo reports: she is not having much pain until she pushes her motion. Shobha related she does hear a lot of popping when she is performing exercises.        Objective   Continued audible and palpable pops occurred during session.    See Exercise, Manual, and Modality Logs for complete treatment.       Assessment/Plan  Shobha demonstrated improved AROM during session and after, no increase in pain. Skilled care required to fully return strength and AROM to prior level.     Visit Diagnoses:    ICD-10-CM ICD-9-CM   1. S/P right rotator cuff repair  Z98.890 V45.89   2. Weakness of right upper extremity  R29.898 729.89   3. Shoulder stiffness, right  M25.611 719.51       Progress per Plan of Care and Progress strengthening /stabilization /functional activity           Timed:  Manual Therapy:    23    mins  52473;  Therapeutic Exercise:    9     mins  15372;     Neuromuscular Michelle:        mins  73538;    Therapeutic Activity:          mins  63307;     Gait Training:           mins  18630;     Ultrasound:          mins  63495;    Electrical Stimulation:         mins  73810 ( );  Aquatics  __   mins   44870    Untimed:  Electrical Stimulation:         mins  55618 ( );  Mechanical Traction:         mins  02377;     Timed Treatment:   32  mins   Total Treatment:     32   mins    Electronically Signed:  Pippa Noriega PTA  Physical Therapist Assistant    [place] [facility] license [id number]

## 2022-05-05 ENCOUNTER — TELEPHONE (OUTPATIENT)
Dept: PHYSICAL THERAPY | Facility: CLINIC | Age: 57
End: 2022-05-05

## 2022-05-12 ENCOUNTER — TELEPHONE (OUTPATIENT)
Dept: PHYSICAL THERAPY | Facility: OTHER | Age: 57
End: 2022-05-12

## 2022-05-19 ENCOUNTER — DOCUMENTATION (OUTPATIENT)
Dept: PHYSICAL THERAPY | Facility: CLINIC | Age: 57
End: 2022-05-19

## 2022-05-19 NOTE — PROGRESS NOTES
Discharge Summary  Discharge Summary from Physical Therapy Report      Dates  PT visit: 2/11/22 - 5/3/22  Number of Visits: 19         Goals: Partially Met    Discharge Plan: Continue with current home exercise program as instructed    Comments Pt called to state the physician is allowing her to progress to [facility]P.     Date of Discharge 5/19/22        Saqib Muhammad PT  Physical Therapist    Electronically signed 5/19/2022    [place] License: PT - [id number]

## 2022-07-22 ENCOUNTER — TRANSCRIBE ORDERS (OUTPATIENT)
Dept: ADMINISTRATIVE | Facility: HOSPITAL | Age: 57
End: 2022-07-22

## 2022-07-22 DIAGNOSIS — Z92.89 HISTORY OF MAMMOGRAPHY, SCREENING: Primary | ICD-10-CM

## 2022-09-28 ENCOUNTER — HOSPITAL ENCOUNTER (OUTPATIENT)
Dept: MAMMOGRAPHY | Facility: HOSPITAL | Age: 57
Discharge: HOME | End: 2022-09-28
Admitting: NURSE PRACTITIONER

## 2022-09-28 DIAGNOSIS — Z92.89 HISTORY OF MAMMOGRAPHY, SCREENING: ICD-10-CM

## 2022-09-28 PROCEDURE — 77067 SCR MAMMO BI INCL CAD: CPT

## 2022-09-28 PROCEDURE — 77063 BREAST TOMOSYNTHESIS BI: CPT

## 2022-10-26 ENCOUNTER — TRANSCRIBE ORDERS (OUTPATIENT)
Dept: ADMINISTRATIVE | Facility: HOSPITAL | Age: 57
End: 2022-10-26

## 2022-10-26 DIAGNOSIS — N64.89 BREAST ASYMMETRY: ICD-10-CM

## 2022-10-26 DIAGNOSIS — R92.8 ABNORMAL MAMMOGRAM: Primary | ICD-10-CM

## 2022-11-07 ENCOUNTER — APPOINTMENT (OUTPATIENT)
Dept: ULTRASOUND IMAGING | Facility: HOSPITAL | Age: 57
End: 2022-11-07

## 2022-11-07 ENCOUNTER — APPOINTMENT (OUTPATIENT)
Dept: MAMMOGRAPHY | Facility: HOSPITAL | Age: 57
End: 2022-11-07

## 2022-11-14 ENCOUNTER — APPOINTMENT (OUTPATIENT)
Dept: ULTRASOUND IMAGING | Facility: HOSPITAL | Age: 57
End: 2022-11-14

## 2022-11-14 ENCOUNTER — APPOINTMENT (OUTPATIENT)
Dept: MAMMOGRAPHY | Facility: HOSPITAL | Age: 57
End: 2022-11-14

## 2022-11-30 ENCOUNTER — APPOINTMENT (OUTPATIENT)
Dept: ULTRASOUND IMAGING | Facility: HOSPITAL | Age: 57
End: 2022-11-30

## 2022-11-30 ENCOUNTER — HOSPITAL ENCOUNTER (OUTPATIENT)
Dept: MAMMOGRAPHY | Facility: HOSPITAL | Age: 57
Discharge: HOME | End: 2022-11-30
Admitting: NURSE PRACTITIONER

## 2022-11-30 DIAGNOSIS — N64.89 BREAST ASYMMETRY: ICD-10-CM

## 2022-11-30 DIAGNOSIS — R92.8 ABNORMAL MAMMOGRAM: ICD-10-CM

## 2022-11-30 PROCEDURE — G0279 TOMOSYNTHESIS, MAMMO: HCPCS

## 2022-11-30 PROCEDURE — 77065 DX MAMMO INCL CAD UNI: CPT

## 2023-04-12 ENCOUNTER — LAB (OUTPATIENT)
Dept: LAB | Facility: HOSPITAL | Age: 58
End: 2023-04-12
Payer: COMMERCIAL

## 2023-04-12 ENCOUNTER — TRANSCRIBE ORDERS (OUTPATIENT)
Dept: LAB | Facility: HOSPITAL | Age: 58
End: 2023-04-12
Payer: COMMERCIAL

## 2023-04-12 DIAGNOSIS — E89.40 SURGICAL MENOPAUSE: Primary | ICD-10-CM

## 2023-04-12 DIAGNOSIS — E89.40 SURGICAL MENOPAUSE: ICD-10-CM

## 2023-04-12 LAB
ESTRADIOL SERPL HS-MCNC: 339 PG/ML
FSH SERPL-ACNC: 1.15 MIU/ML
TESTOST SERPL-MCNC: 321 NG/DL (ref 2.9–40.8)

## 2023-04-12 PROCEDURE — 84403 ASSAY OF TOTAL TESTOSTERONE: CPT

## 2023-04-12 PROCEDURE — 83001 ASSAY OF GONADOTROPIN (FSH): CPT

## 2023-04-12 PROCEDURE — 36415 COLL VENOUS BLD VENIPUNCTURE: CPT

## 2023-04-12 PROCEDURE — 82670 ASSAY OF TOTAL ESTRADIOL: CPT

## 2024-03-11 ENCOUNTER — TRANSCRIBE ORDERS (OUTPATIENT)
Dept: ADMINISTRATIVE | Facility: HOSPITAL | Age: 59
End: 2024-03-11
Payer: COMMERCIAL

## 2024-03-11 DIAGNOSIS — Z12.31 SCREENING MAMMOGRAM FOR BREAST CANCER: Primary | ICD-10-CM

## 2024-05-13 ENCOUNTER — HOSPITAL ENCOUNTER (OUTPATIENT)
Dept: MAMMOGRAPHY | Facility: HOSPITAL | Age: 59
Discharge: HOME | End: 2024-05-13
Admitting: NURSE PRACTITIONER
Payer: COMMERCIAL

## 2024-05-13 DIAGNOSIS — Z12.31 SCREENING MAMMOGRAM FOR BREAST CANCER: ICD-10-CM

## 2024-05-13 PROCEDURE — 77063 BREAST TOMOSYNTHESIS BI: CPT

## 2024-05-13 PROCEDURE — 77067 SCR MAMMO BI INCL CAD: CPT

## 2024-05-20 ENCOUNTER — TRANSCRIBE ORDERS (OUTPATIENT)
Dept: ADMINISTRATIVE | Facility: HOSPITAL | Age: 59
End: 2024-05-20
Payer: COMMERCIAL

## 2024-05-20 DIAGNOSIS — R92.8 ABNORMAL MAMMOGRAM: Primary | ICD-10-CM

## 2024-06-11 ENCOUNTER — HOSPITAL ENCOUNTER (OUTPATIENT)
Dept: MAMMOGRAPHY | Facility: HOSPITAL | Age: 59
Discharge: HOME | End: 2024-06-11
Payer: COMMERCIAL

## 2024-06-11 ENCOUNTER — HOSPITAL ENCOUNTER (OUTPATIENT)
Dept: ULTRASOUND IMAGING | Facility: HOSPITAL | Age: 59
Discharge: HOME | End: 2024-06-11
Payer: COMMERCIAL

## 2024-06-11 DIAGNOSIS — R92.8 ABNORMAL MAMMOGRAM: ICD-10-CM

## 2024-06-11 PROCEDURE — 77065 DX MAMMO INCL CAD UNI: CPT

## 2024-06-11 PROCEDURE — G0279 TOMOSYNTHESIS, MAMMO: HCPCS

## 2024-07-16 ENCOUNTER — HOSPITAL ENCOUNTER (OUTPATIENT)
Dept: MAMMOGRAPHY | Facility: HOSPITAL | Age: 59
Discharge: HOME | End: 2024-07-16
Payer: COMMERCIAL

## 2024-07-16 ENCOUNTER — PATIENT OUTREACH (OUTPATIENT)
Dept: ONCOLOGY | Facility: HOSPITAL | Age: 59
End: 2024-07-16
Payer: COMMERCIAL

## 2024-07-16 DIAGNOSIS — R92.1 BREAST CALCIFICATIONS: ICD-10-CM

## 2024-07-16 PROCEDURE — C1819 TISSUE LOCALIZATION-EXCISION: HCPCS

## 2024-07-16 PROCEDURE — 25010000002 LIDOCAINE 1 % SOLUTION

## 2024-07-16 PROCEDURE — 76098 X-RAY EXAM SURGICAL SPECIMEN: CPT

## 2024-07-16 PROCEDURE — A4648 IMPLANTABLE TISSUE MARKER: HCPCS

## 2024-07-16 RX ORDER — LIDOCAINE HYDROCHLORIDE AND EPINEPHRINE 10; 10 MG/ML; UG/ML
INJECTION, SOLUTION INFILTRATION; PERINEURAL
Status: COMPLETED
Start: 2024-07-16 | End: 2024-07-16

## 2024-07-16 RX ORDER — LIDOCAINE HYDROCHLORIDE 10 MG/ML
INJECTION, SOLUTION INFILTRATION; PERINEURAL
Status: COMPLETED
Start: 2024-07-16 | End: 2024-07-16

## 2024-07-16 RX ORDER — DIAPER,BRIEF,INFANT-TODD,DISP
EACH MISCELLANEOUS
Status: COMPLETED
Start: 2024-07-16 | End: 2024-07-16

## 2024-07-16 RX ADMIN — BACITRACIN: 500 OINTMENT TOPICAL at 13:41

## 2024-07-16 RX ADMIN — LIDOCAINE HYDROCHLORIDE: 10 INJECTION, SOLUTION INFILTRATION; PERINEURAL at 13:41

## 2024-07-16 RX ADMIN — LIDOCAINE HYDROCHLORIDE,EPINEPHRINE BITARTRATE: 10; .01 INJECTION, SOLUTION INFILTRATION; PERINEURAL at 13:41

## 2024-07-22 LAB
CYTO UR: NORMAL
LAB AP CASE REPORT: NORMAL
LAB AP CLINICAL INFORMATION: NORMAL
LAB AP SPECIAL STAINS: NORMAL
PATH REPORT.FINAL DX SPEC: NORMAL
PATH REPORT.GROSS SPEC: NORMAL